# Patient Record
Sex: FEMALE | Race: WHITE | NOT HISPANIC OR LATINO | Employment: STUDENT | URBAN - METROPOLITAN AREA
[De-identification: names, ages, dates, MRNs, and addresses within clinical notes are randomized per-mention and may not be internally consistent; named-entity substitution may affect disease eponyms.]

---
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
## 2022-02-16 ENCOUNTER — TELEPHONE (OUTPATIENT)
Dept: UROLOGY | Facility: CLINIC | Age: 22
End: 2022-02-16
Payer: MEDICAID

## 2022-02-16 NOTE — TELEPHONE ENCOUNTER
----- Message from Tanya Correa sent at 2/16/2022 10:21 AM CST -----  Regarding: STAT appt request  Good morning,    Current pt is being referred to urology from JEAN-CLAUDE Preston for a stat appt, dx is kidney stones. I have scanned the referral and records in to media mgr. Please contact pt to schedule and let me know if I can help any further.    Thank you,  Tanya Correa  Clinic   Ext [phone]

## 2022-02-21 NOTE — PROGRESS NOTES
"Subjective:      Main Douglass is a 21 y.o. female who was referred by Dr. Josi Preston for evaluation of right renal stone.      Presented to ED on 2/10 with left flank pain. CT RSS revealed "3 mm stone within the left distal ureter, just proximal to the UVJ resulting in mild hydronephrosis and hydroureter."  UA revealed 3+ blood, no leuk, no nitrates. Discharged with flomax and Zofran. She is unsure if she passed stone but is no longer experiencing flank pain. Denies previous renal stone history. Denies f/c/n/v.     The following portions of the patient's history were reviewed and updated as appropriate: allergies, current medications, past family history, past medical history, past social history, past surgical history and problem list.    Review of Systems  Constitutional: no fever or chills  ENT: no nasal congestion or sore throat  Respiratory: no cough or shortness of breath  Cardiovascular: no chest pain or palpitations  Gastrointestinal: no nausea or vomiting, tolerating diet  Genitourinary: as per HPI  Hematologic/Lymphatic: no easy bruising or lymphadenopathy  Musculoskeletal: no arthralgias or myalgias  Neurological: no seizures or tremors  Behavioral/Psych: no auditory or visual hallucinations     Objective:   Vital Signs:/65 (BP Location: Left arm, Patient Position: Sitting, BP Method: Small (Automatic))   Pulse 96   Ht 5' 3" (1.6 m)   Wt 51.2 kg (112 lb 15.8 oz)   BMI 20.01 kg/m²     Physical Exam   General: alert and oriented, no acute distress  Head: normocephalic, atraumatic  Neck: supple, no lymphadenopathy, normal ROM, no masses  Respiratory: Symmetric expansion, non-labored breathing  Cardiovascular: regular rate and rhythm, nomal pulses, no peripheral edema  Abdomen: soft, non tender, non distended, no palpable masses, no hernias, no hepatomegaly or splenomegaly  Lymphatic: no inguinal nodes  Skin: normal coloration and turgor, no rashes, no suspicious skin lesions noted  Neuro: " alert and oriented x3, no gross deficits  Psych: normal judgment and insight, normal mood/affect and non-anxious    Lab Review   Urinalysis demonstrates positive for red blood cells (trace)  Lab Results   Component Value Date    WBC 10.73 02/10/2022    HGB 12.5 02/10/2022    HCT 38.0 02/10/2022    MCV 96 02/10/2022     02/10/2022     Lab Results   Component Value Date    CREATININE 0.8 02/10/2022    BUN 9 02/10/2022       Imaging   Results for orders placed during the hospital encounter of 02/10/22    CT Renal Stone Study ABD Pelvis WO    Narrative  EXAMINATION:  CT RENAL STONE STUDY ABD PELVIS WO    CLINICAL HISTORY:  Nephrolithiasis, uncomplicated;    TECHNIQUE:  Multiplanar images were obtained of the abdomen and pelvis from the hemidiaphragms through the symphysis pubis without intravenous contrast.    COMPARISON:  None    FINDINGS:  Lung Bases: Clear.    Heart: Heart size is normal.  No pericardial effusion.    Liver: Small focus of fat deposition adjacent to falciform ligament.  The liver is otherwise unremarkable without focal lesion.    Biliary tract: No intrahepatic or extrahepatic biliary ductal dilatation.    Gallbladder: No radiodense gallstone. No wall thickening or pericholecystic fluid.    Pancreas: Normal. No pancreatic ductal dilatation.    Spleen: Normal size without focal lesion.    Adrenals: Normal.    Kidneys and urinary collecting systems: There is a 3 mm stone within the left distal ureter, just proximal to the UVJ resulting in mild hydronephrosis and hydroureter.  There are several punctate right-sided nonobstructing nephroliths.  No right-sided hydronephrosis.    Lymph nodes: None enlarged.    Stomach and bowel: The stomach is normal.  Loops of small and large bowel are normal in caliber without evidence for inflammation or obstruction.  The appendix is visualized and is normal.    Peritoneum and mesentery: No ascites or free intraperitoneal air. No abdominal fluid  collection.    Vasculature: Normal.    Urinary bladder: Normal.    Reproductive organs: The uterus and adnexae are unremarkable.    Body wall: No abnormality.    Musculoskeletal: No aggressive osseous lesion.    Impression  Stone measuring 3 mm within the left distal ureter resulting in mild hydronephrosis.    Several additional punctate nonobstructing right-sided nephroliths.      Electronically signed by: Lonnie Robles  Date:    02/10/2022  Time:    08:19      Assessment and Plan:   1. Nephrolithiasis  --Discussed CT results: 1.5 mm non obstructing right renal stone; will monitor with KUB in 6 months   --Will obtain RBUS to evaluation hydronephrosis  --We discussed how to prevent future stones:   --Eat fewer high-oxalate foods (spinach, bran flakes, rhubarb, beets, potato chips, french fries, nuts and nut butters)   --Eat calcium rich foods   --Limit the vitamin C content of your diet. Do not take more than 500 mg of Vitamin C daily.   --It is very important to drink plenty of liquids. Your goal should be 10-12 glasses a day. At least 5-6 glasses should be water.    --Reduce sodium intake: 2-3 grams per day. Limit eating processed foods such as hot dogs, deli meats, sausage, canned products, dry soup mixes, sauerkraut, pickles, and various convenience mixes.   --Add citrus to diet i.e. lemonade, limes, orange juice.     --RTC in 1 month after RBUS          This note is dictated on M*Modal word recognition program.  There are word recognition mistakes that are occasionally missed on review.

## 2022-02-25 ENCOUNTER — OFFICE VISIT (OUTPATIENT)
Dept: UROLOGY | Facility: CLINIC | Age: 22
End: 2022-02-25
Payer: MEDICAID

## 2022-02-25 VITALS
SYSTOLIC BLOOD PRESSURE: 116 MMHG | DIASTOLIC BLOOD PRESSURE: 65 MMHG | HEIGHT: 63 IN | WEIGHT: 113 LBS | BODY MASS INDEX: 20.02 KG/M2 | HEART RATE: 96 BPM

## 2022-02-25 DIAGNOSIS — N20.0 NEPHROLITHIASIS: Primary | ICD-10-CM

## 2022-02-25 LAB
BILIRUB SERPL-MCNC: ABNORMAL MG/DL
BLOOD URINE, POC: ABNORMAL
CLARITY, POC UA: CLEAR
COLOR, POC UA: YELLOW
GLUCOSE UR QL STRIP: NORMAL
KETONES UR QL STRIP: ABNORMAL
LEUKOCYTE ESTERASE URINE, POC: ABNORMAL
NITRITE, POC UA: ABNORMAL
PH, POC UA: 7
PROTEIN, POC: ABNORMAL
SPECIFIC GRAVITY, POC UA: 1
UROBILINOGEN, POC UA: ABNORMAL

## 2022-02-25 PROCEDURE — 3008F BODY MASS INDEX DOCD: CPT | Mod: CPTII,,, | Performed by: NURSE PRACTITIONER

## 2022-02-25 PROCEDURE — 99999 PR PBB SHADOW E&M-EST. PATIENT-LVL IV: ICD-10-PCS | Mod: PBBFAC,,, | Performed by: NURSE PRACTITIONER

## 2022-02-25 PROCEDURE — 3074F PR MOST RECENT SYSTOLIC BLOOD PRESSURE < 130 MM HG: ICD-10-PCS | Mod: CPTII,,, | Performed by: NURSE PRACTITIONER

## 2022-02-25 PROCEDURE — 99203 PR OFFICE/OUTPT VISIT, NEW, LEVL III, 30-44 MIN: ICD-10-PCS | Mod: S$PBB,,, | Performed by: NURSE PRACTITIONER

## 2022-02-25 PROCEDURE — 99999 PR PBB SHADOW E&M-EST. PATIENT-LVL IV: CPT | Mod: PBBFAC,,, | Performed by: NURSE PRACTITIONER

## 2022-02-25 PROCEDURE — 99214 OFFICE O/P EST MOD 30 MIN: CPT | Mod: PBBFAC,PN | Performed by: NURSE PRACTITIONER

## 2022-02-25 PROCEDURE — 1159F PR MEDICATION LIST DOCUMENTED IN MEDICAL RECORD: ICD-10-PCS | Mod: CPTII,,, | Performed by: NURSE PRACTITIONER

## 2022-02-25 PROCEDURE — 3074F SYST BP LT 130 MM HG: CPT | Mod: CPTII,,, | Performed by: NURSE PRACTITIONER

## 2022-02-25 PROCEDURE — 99203 OFFICE O/P NEW LOW 30 MIN: CPT | Mod: S$PBB,,, | Performed by: NURSE PRACTITIONER

## 2022-02-25 PROCEDURE — 81002 URINALYSIS NONAUTO W/O SCOPE: CPT | Mod: PBBFAC,PN | Performed by: NURSE PRACTITIONER

## 2022-02-25 PROCEDURE — 1159F MED LIST DOCD IN RCRD: CPT | Mod: CPTII,,, | Performed by: NURSE PRACTITIONER

## 2022-02-25 PROCEDURE — 1160F PR REVIEW ALL MEDS BY PRESCRIBER/CLIN PHARMACIST DOCUMENTED: ICD-10-PCS | Mod: CPTII,,, | Performed by: NURSE PRACTITIONER

## 2022-02-25 PROCEDURE — 3008F PR BODY MASS INDEX (BMI) DOCUMENTED: ICD-10-PCS | Mod: CPTII,,, | Performed by: NURSE PRACTITIONER

## 2022-02-25 PROCEDURE — 3078F DIAST BP <80 MM HG: CPT | Mod: CPTII,,, | Performed by: NURSE PRACTITIONER

## 2022-02-25 PROCEDURE — 3078F PR MOST RECENT DIASTOLIC BLOOD PRESSURE < 80 MM HG: ICD-10-PCS | Mod: CPTII,,, | Performed by: NURSE PRACTITIONER

## 2022-02-25 PROCEDURE — 1160F RVW MEDS BY RX/DR IN RCRD: CPT | Mod: CPTII,,, | Performed by: NURSE PRACTITIONER

## 2022-02-25 RX ORDER — PROMETHAZINE HYDROCHLORIDE 12.5 MG/1
12.5 TABLET ORAL EVERY 8 HOURS PRN
COMMUNITY
Start: 2022-02-15

## 2022-02-25 RX ORDER — OXYCODONE AND ACETAMINOPHEN 10; 325 MG/1; MG/1
1 TABLET ORAL EVERY 6 HOURS PRN
COMMUNITY
Start: 2022-02-10 | End: 2022-09-15

## 2022-03-24 ENCOUNTER — TELEPHONE (OUTPATIENT)
Dept: UROLOGY | Facility: CLINIC | Age: 22
End: 2022-03-24
Payer: MEDICAID

## 2022-03-24 NOTE — TELEPHONE ENCOUNTER
----- Message from Erendira Zapata sent at 3/24/2022 11:26 AM CDT -----  Needs advice from nurse:      Who Called:Fco Collazo  Regarding:patient needs to reschedule audio visit due to not having US bc of the weather  Would the patient rather a call back or VIA V2contactner?  Best Call Back number:  Additional Info:

## 2022-03-24 NOTE — TELEPHONE ENCOUNTER
Spoke with pt. Stated that daughter had to reschedule ultrasound and will need to change follow up appointment. Stated that appointment has been changed.

## 2022-04-04 ENCOUNTER — TELEPHONE (OUTPATIENT)
Dept: UROLOGY | Facility: CLINIC | Age: 22
End: 2022-04-04
Payer: MEDICAID

## 2023-04-26 ENCOUNTER — OFFICE VISIT (OUTPATIENT)
Dept: UROLOGY | Facility: CLINIC | Age: 23
End: 2023-04-26
Payer: MEDICAID

## 2023-04-26 ENCOUNTER — TELEPHONE (OUTPATIENT)
Dept: UROLOGY | Facility: CLINIC | Age: 23
End: 2023-04-26
Payer: MEDICAID

## 2023-04-26 VITALS
HEIGHT: 62 IN | WEIGHT: 129.31 LBS | DIASTOLIC BLOOD PRESSURE: 64 MMHG | SYSTOLIC BLOOD PRESSURE: 105 MMHG | BODY MASS INDEX: 23.79 KG/M2 | HEART RATE: 90 BPM

## 2023-04-26 DIAGNOSIS — N13.30 HYDRONEPHROSIS OF RIGHT KIDNEY: ICD-10-CM

## 2023-04-26 DIAGNOSIS — N20.1 RIGHT URETERAL STONE: Primary | ICD-10-CM

## 2023-04-26 LAB
BILIRUB SERPL-MCNC: NEGATIVE MG/DL
BLOOD URINE, POC: ABNORMAL
CLARITY, POC UA: CLEAR
COLOR, POC UA: YELLOW
GLUCOSE UR QL STRIP: NEGATIVE
KETONES UR QL STRIP: NEGATIVE
LEUKOCYTE ESTERASE URINE, POC: NEGATIVE
NITRITE, POC UA: NEGATIVE
PH, POC UA: 6
PROTEIN, POC: NEGATIVE
SPECIFIC GRAVITY, POC UA: 1.01
UROBILINOGEN, POC UA: NORMAL

## 2023-04-26 PROCEDURE — 1160F PR REVIEW ALL MEDS BY PRESCRIBER/CLIN PHARMACIST DOCUMENTED: ICD-10-PCS | Mod: CPTII,,, | Performed by: NURSE PRACTITIONER

## 2023-04-26 PROCEDURE — 3078F PR MOST RECENT DIASTOLIC BLOOD PRESSURE < 80 MM HG: ICD-10-PCS | Mod: CPTII,,, | Performed by: NURSE PRACTITIONER

## 2023-04-26 PROCEDURE — 87086 URINE CULTURE/COLONY COUNT: CPT | Performed by: NURSE PRACTITIONER

## 2023-04-26 PROCEDURE — 99999 PR PBB SHADOW E&M-EST. PATIENT-LVL IV: CPT | Mod: PBBFAC,,, | Performed by: NURSE PRACTITIONER

## 2023-04-26 PROCEDURE — 1159F PR MEDICATION LIST DOCUMENTED IN MEDICAL RECORD: ICD-10-PCS | Mod: CPTII,,, | Performed by: NURSE PRACTITIONER

## 2023-04-26 PROCEDURE — 99999 PR PBB SHADOW E&M-EST. PATIENT-LVL IV: ICD-10-PCS | Mod: PBBFAC,,, | Performed by: NURSE PRACTITIONER

## 2023-04-26 PROCEDURE — 1159F MED LIST DOCD IN RCRD: CPT | Mod: CPTII,,, | Performed by: NURSE PRACTITIONER

## 2023-04-26 PROCEDURE — 3074F SYST BP LT 130 MM HG: CPT | Mod: CPTII,,, | Performed by: NURSE PRACTITIONER

## 2023-04-26 PROCEDURE — 3078F DIAST BP <80 MM HG: CPT | Mod: CPTII,,, | Performed by: NURSE PRACTITIONER

## 2023-04-26 PROCEDURE — 99214 PR OFFICE/OUTPT VISIT, EST, LEVL IV, 30-39 MIN: ICD-10-PCS | Mod: S$PBB,,, | Performed by: NURSE PRACTITIONER

## 2023-04-26 PROCEDURE — 1160F RVW MEDS BY RX/DR IN RCRD: CPT | Mod: CPTII,,, | Performed by: NURSE PRACTITIONER

## 2023-04-26 PROCEDURE — 3008F PR BODY MASS INDEX (BMI) DOCUMENTED: ICD-10-PCS | Mod: CPTII,,, | Performed by: NURSE PRACTITIONER

## 2023-04-26 PROCEDURE — 3074F PR MOST RECENT SYSTOLIC BLOOD PRESSURE < 130 MM HG: ICD-10-PCS | Mod: CPTII,,, | Performed by: NURSE PRACTITIONER

## 2023-04-26 PROCEDURE — 99214 OFFICE O/P EST MOD 30 MIN: CPT | Mod: PBBFAC,PN | Performed by: NURSE PRACTITIONER

## 2023-04-26 PROCEDURE — 81002 URINALYSIS NONAUTO W/O SCOPE: CPT | Mod: PBBFAC,PN | Performed by: NURSE PRACTITIONER

## 2023-04-26 PROCEDURE — 99214 OFFICE O/P EST MOD 30 MIN: CPT | Mod: S$PBB,,, | Performed by: NURSE PRACTITIONER

## 2023-04-26 PROCEDURE — 3008F BODY MASS INDEX DOCD: CPT | Mod: CPTII,,, | Performed by: NURSE PRACTITIONER

## 2023-04-26 RX ORDER — TAMSULOSIN HYDROCHLORIDE 0.4 MG/1
0.4 CAPSULE ORAL DAILY
Qty: 30 CAPSULE | Refills: 11 | Status: SHIPPED | OUTPATIENT
Start: 2023-04-26 | End: 2023-04-26

## 2023-04-26 RX ORDER — KETOROLAC TROMETHAMINE 10 MG/1
10 TABLET, FILM COATED ORAL EVERY 6 HOURS PRN
Qty: 30 TABLET | Refills: 0 | Status: SHIPPED | OUTPATIENT
Start: 2023-04-26

## 2023-04-26 NOTE — TELEPHONE ENCOUNTER
Called pt, no answer, vm left  Called pt mom, pt mom stated that pt has an appointment with debbie today @ 2:30pm.

## 2023-04-26 NOTE — PROGRESS NOTES
"Subjective:      Main Douglass is a 22 y.o. female who returns today regarding right UVJ stone.    Urolithiasis  Patient complains of right flank pain with and without radiation to the groin. Onset of symptoms was abrupt starting 2 days ago with controlled course since that time. Patient describes the pain as colicky, cramping, and stabbing, and rated as moderate and off and on. The patient has had nausea and no vomiting and diaphoresis. There has been no fever or chills. The patient is not complaining of dysuria or frequency. Risk factors for urolithiasis: none.  She presented to the emergency department yesterday (4/25) with these complaints.  CT renal stone study revealed 5 mm right UVJ stone with associated hydronephrosis. Discharged with MET; has not passed stone.       The following portions of the patient's history were reviewed and updated as appropriate: allergies, current medications, past family history, past medical history, past social history, past surgical history and problem list.    Review of Systems  A comprehensive multipoint review of systems was negative except as otherwise stated in the HPI.     Objective:   Vitals: /64 (BP Location: Right arm, Patient Position: Sitting, BP Method: Small (Automatic))   Pulse 90   Ht 5' 2" (1.575 m)   Wt 58.7 kg (129 lb 4.8 oz)   BMI 23.65 kg/m²       Physical Exam   General: alert and oriented, no acute distress  Head: normocephalic, atraumatic  Neck: supple, no lymphadenopathy, normal ROM, no masses  Respiratory: Symmetric expansion, non-labored breathing  Cardiovascular: regular rate and rhythm, nomal pulses, no peripheral edema  Abdomen: soft, non tender, non distended, no palpable masses, no hernias, no hepatomegaly or splenomegaly  Skin: normal coloration and turgor, no rashes, no suspicious skin lesions noted  Neuro: alert and oriented x3, no gross deficits  Psych: normal judgment and insight, normal mood/affect, and non-anxious    Lab Review "   Urinalysis demonstrates positive for red blood cells (trace)  Lab Results   Component Value Date    WBC 10.72 04/25/2023    HGB 11.6 (L) 04/25/2023    HCT 36.9 (L) 04/25/2023    MCV 94 04/25/2023     04/25/2023     Lab Results   Component Value Date    CREATININE 0.9 04/25/2023    BUN 9 04/25/2023       Imaging   Results for orders placed during the hospital encounter of 04/25/23    CT Renal Stone Study ABD Pelvis WO    Narrative  EXAMINATION:  CT RENAL STONE STUDY ABD PELVIS WO    CLINICAL HISTORY:  Flank pain, kidney stone suspected;    TECHNIQUE:  Low dose axial images, sagittal and coronal reformations were obtained from the lung bases to the pubic symphysis without oral or IV contrast    COMPARISON:  CT abdomen pelvis 09/15/2022    FINDINGS:  Abdomen:    - Lung bases: Lung bases are clear.  There is no cardiomegaly.    Lack of intravenous contrast limits evaluation of the solid organs for focal lesions.    - Liver: Liver is homogeneous as imaged.    - Gallbladder: There is no CT evidence of cholecystitis and no calcified gallstone.    - Bile Ducts: No evidence of intra or extra hepatic biliary ductal dilation.    - Spleen: Negative.    - Kidneys: There is a 5 mm suspected calculus at the level of the right UVJ and moderate hydronephrosis and ureterectasis.  No other calculi are seen in the right kidney.  Left kidney cortex appears homogeneous as imaged and there is no evidence of hydronephrosis or calculus.    - Adrenals: Unremarkable.    - Pancreas: There is no mass or peripancreatic fat stranding.    - Retroperitoneum:  No significant adenopathy.    - Vascular: No abdominal aortic aneurysm or significant adenopathy.    - Abdominal wall:  Unremarkable.    Pelvis:    Bladder is collapsed and otherwise unremarkable as imaged.  No pelvic mass, adenopathy, or free fluid.    Bowel/Mesentery:    Appendix appears normal.  There is no evidence of bowel obstruction or mesenteric adenopathy or inflammation.   There is no extraluminal free air or free fluid in the abdomen or pelvis.    Bones: Spine alignment is intact.  There is mild loss of disc height at L5-S1.  There is no acute osseous abnormality and no suspicious lytic or blastic lesion.    Impression  5 mm obstructing calculus at the level of the right UVJ with moderate hydro ureterectasis on the right.  No other calculi.    No other acute abnormalities in the abdomen or pelvis or significant findings as imaged..    This report was flagged in Epic as abnormal.      Electronically signed by: Soledad Corrigan  Date:    04/25/2023  Time:    21:40      Assessment and Plan:   1. Hydronephrosis of right kidney  2. Right ureteral stone  --reviewed CT with pt and discussed high probability  --Recommend general preventive measures, to include increased hydration, low Na diet, and increased citrus intake  --Discussed indications to present to ED, including fever, intractable pain, and intractable nausea/vomitting      --Drink 2-3 liters of water daily  --Continue flomax  --Continue Toradol for pain; rx sent   --Continue narcotic as needed for breakthrough pain  --Strain every urine.  Strainer provided.    --Patient instructed to bring in stone for stone analysis.  Sterile cup provided.      --will notify with results  --FU in 1 month with RBUS   --work note provided     This note is dictated on M*Modal word recognition program.  There are word recognition mistakes that are occasionally missed on review.

## 2023-04-27 LAB — BACTERIA UR CULT: NO GROWTH

## 2023-06-01 ENCOUNTER — TELEPHONE (OUTPATIENT)
Dept: UROLOGY | Facility: CLINIC | Age: 23
End: 2023-06-01
Payer: MEDICAID

## 2023-06-01 NOTE — TELEPHONE ENCOUNTER
No answer. Message left to call clinic back. Appt today is canceled due to US not being done. US and f/u appt needs to be rescheduled.

## 2023-11-04 ENCOUNTER — HOSPITAL ENCOUNTER (EMERGENCY)
Facility: HOSPITAL | Age: 23
Discharge: HOME | End: 2023-11-04
Attending: EMERGENCY MEDICINE
Payer: MEDICAID

## 2023-11-04 VITALS
DIASTOLIC BLOOD PRESSURE: 82 MMHG | WEIGHT: 120 LBS | SYSTOLIC BLOOD PRESSURE: 128 MMHG | HEART RATE: 88 BPM | TEMPERATURE: 98 F | HEIGHT: 63 IN | BODY MASS INDEX: 21.26 KG/M2 | RESPIRATION RATE: 16 BRPM | OXYGEN SATURATION: 98 %

## 2023-11-04 DIAGNOSIS — S61.210A LACERATION OF RIGHT INDEX FINGER WITHOUT FOREIGN BODY WITHOUT DAMAGE TO NAIL, INITIAL ENCOUNTER: Primary | ICD-10-CM

## 2023-11-04 DIAGNOSIS — S62.639B OPEN FRACTURE OF TUFT OF DISTAL PHALANX OF FINGER: ICD-10-CM

## 2023-11-04 LAB
B-HCG UR QL: NEGATIVE
CTP QC/QA: YES

## 2023-11-04 PROCEDURE — 99284 EMERGENCY DEPT VISIT MOD MDM: CPT

## 2023-11-04 PROCEDURE — 96372 THER/PROPH/DIAG INJ SC/IM: CPT | Performed by: PHYSICIAN ASSISTANT

## 2023-11-04 PROCEDURE — 81025 URINE PREGNANCY TEST: CPT | Performed by: PHYSICIAN ASSISTANT

## 2023-11-04 PROCEDURE — 63600175 PHARM REV CODE 636 W HCPCS: Performed by: PHYSICIAN ASSISTANT

## 2023-11-04 PROCEDURE — 90715 TDAP VACCINE 7 YRS/> IM: CPT | Performed by: PHYSICIAN ASSISTANT

## 2023-11-04 PROCEDURE — 90471 IMMUNIZATION ADMIN: CPT | Performed by: PHYSICIAN ASSISTANT

## 2023-11-04 PROCEDURE — 25000003 PHARM REV CODE 250: Performed by: PHYSICIAN ASSISTANT

## 2023-11-04 RX ORDER — LIDOCAINE HYDROCHLORIDE 10 MG/ML
10 INJECTION INFILTRATION; PERINEURAL
Status: COMPLETED | OUTPATIENT
Start: 2023-11-04 | End: 2023-11-04

## 2023-11-04 RX ORDER — KETOROLAC TROMETHAMINE 30 MG/ML
15 INJECTION, SOLUTION INTRAMUSCULAR; INTRAVENOUS
Status: COMPLETED | OUTPATIENT
Start: 2023-11-04 | End: 2023-11-04

## 2023-11-04 RX ORDER — SULFAMETHOXAZOLE AND TRIMETHOPRIM 800; 160 MG/1; MG/1
1 TABLET ORAL 2 TIMES DAILY
Qty: 20 TABLET | Refills: 0 | Status: SHIPPED | OUTPATIENT
Start: 2023-11-04 | End: 2023-11-14

## 2023-11-04 RX ADMIN — LIDOCAINE HYDROCHLORIDE 10 ML: 10 INJECTION, SOLUTION INFILTRATION; PERINEURAL at 01:11

## 2023-11-04 RX ADMIN — TETANUS TOXOID, REDUCED DIPHTHERIA TOXOID AND ACELLULAR PERTUSSIS VACCINE, ADSORBED 0.5 ML: 5; 2.5; 8; 8; 2.5 SUSPENSION INTRAMUSCULAR at 01:11

## 2023-11-04 RX ADMIN — KETOROLAC TROMETHAMINE 15 MG: 30 INJECTION, SOLUTION INTRAMUSCULAR; INTRAVENOUS at 01:11

## 2023-11-04 NOTE — ED NOTES
LOC: The patient is awake, alert and aware of environment with an appropriate affect, the patient is oriented x 4 and speaking appropriately.  APPEARANCE: Patient resting comfortably and in no acute distress, patient is clean and well groomed.  SKIN: The skin is warm and dry, color consistent with ethnicity, patient has normal skin turgor and moist mucus membranes, skin intact, no breakdown or brusing noted.  MUSKULOSKELETAL: Patient moving all extremities well, obvious swelling and deformities noted to 2nd digit on right hand.  Patient endorses slamming it in car door.  RESPIRATORY: Airway is open and patent, respirations are spontaneous, patient has a normal effort and rate, no accessory muscle use noted.  CARDIAC: Patient has a normal rate and rhythm, no periphreal edema noted, capillary refill < 3 seconds.  ABDOMEN: Soft and non tender to palpation, no distention noted.   NEUROLOGIC: PERRL, eyes open spontaneously, behavior appropriate to situation, follows commands, facial expression symmetrical, bilateral hand grasp equal and even, purposeful motor response noted, normal sensation in all extremities when touched with a finger.

## 2023-11-04 NOTE — ED PROVIDER NOTES
Encounter Date: 11/4/2023       History     Chief Complaint   Patient presents with    Finger Injury     Right index finger slammed in door      23-year-old female presents ER for evaluation of finger injury.  Patient states that about 20 minutes prior to arrival to ER she accidentally slammed her right index finger against the car door.  She reports some bleeding at the site.  Denies any paresthesia focal weakness.  She is unsure of tetanus status.  Did not take any medicine prior to arrival to ER.  No recent surgery to the site.  Does not use blood thinners    The history is provided by the patient.     Review of patient's allergies indicates:  No Known Allergies  No past medical history on file.  No past surgical history on file.  No family history on file.  Social History     Tobacco Use    Smoking status: Never   Substance Use Topics    Alcohol use: Not Currently    Drug use: Yes     Types: Marijuana     Review of Systems   Constitutional:  Negative for fever.   Musculoskeletal:  Positive for arthralgias. Negative for myalgias.   Skin:  Positive for wound.   Allergic/Immunologic: Negative for immunocompromised state.   Neurological:  Negative for weakness and numbness.   Hematological:  Does not bruise/bleed easily.   Psychiatric/Behavioral:  Negative for confusion.        Physical Exam     Initial Vitals [11/04/23 1232]   BP Pulse Resp Temp SpO2   (!) 132/92 97 19 98.7 °F (37.1 °C) 99 %      MAP       --         Physical Exam    Vitals reviewed.  Constitutional: She appears well-developed and well-nourished. She is not diaphoretic. No distress.   HENT:   Head: Normocephalic and atraumatic.   Eyes: Conjunctivae and EOM are normal.   Neck: Neck supple.   Cardiovascular:  Intact distal pulses.           Pulmonary/Chest: No respiratory distress.   Musculoskeletal:      Right hand: Swelling and bony tenderness (distal index finger- small superficial laceration at the matrix of nail) present. Decreased range of  motion (due to pain).      Cervical back: Neck supple.     Neurological: She is alert and oriented to person, place, and time.   Skin: Skin is warm.         ED Course   Procedures  Labs Reviewed   POCT URINE PREGNANCY          Imaging Results               X-Ray Finger 2 or More Views Right (Final result)  Result time 11/04/23 13:56:06      Final result by Melva Johnson MD (11/04/23 13:56:06)                   Impression:      As above.  If clinically indicated, follow-up radiographs could be performed to confirm the linear cortical lucency along the posterior aspect of the distal phalanx of the index finger does not represent a nondisplaced fracture.    This report was flagged in Epic as abnormal.      Electronically signed by: Melva Johnson MD  Date:    11/04/2023  Time:    13:56               Narrative:    EXAMINATION:  XR FINGER 2 OR MORE VIEWS RIGHT    CLINICAL HISTORY:  finger injury w/ laceration;    TECHNIQUE:  AP, oblique, and lateral radiographs of the right index finger were performed.    COMPARISON:  November 5, 2013    FINDINGS:  There is a skin defect with air in the soft tissues along the dorsal aspect of the distal phalanx of the index finger.  Cortical lucency along the posterior surface of the distal phalanx may represent a vascular channel, but a nondisplaced fracture cannot be completely excluded.  No other findings to suggest acute fracture or bony destructive process.  Alignment is preserved.                                       Medications   ketorolac injection 15 mg (15 mg Intramuscular Given 11/4/23 1346)   Tdap (BOOSTRIX) vaccine injection 0.5 mL (0.5 mLs Intramuscular Given 11/4/23 1352)   LIDOcaine HCL 10 mg/ml (1%) injection 10 mL (10 mLs Infiltration Given by Other 11/4/23 1348)     Medical Decision Making  Differentials:  Finger fracture, laceration, contusion    Amount and/or Complexity of Data Reviewed  Labs: ordered.  Radiology: ordered. Decision-making details documented  in ED Course.    Risk  Prescription drug management.         APC / Resident Notes:   In the ER promptly upon arrival.  She is afebrile, no acute distress.        ED Course as of 11/04/23 1837   Sat Nov 04, 2023   1449 X-Ray Finger 2 or More Views Right(!) [AJ]   1449 FINDINGS:  There is a skin defect with air in the soft tissues along the dorsal aspect of the distal phalanx of the index finger.  Cortical lucency along the posterior surface of the distal phalanx may represent a vascular channel, but a nondisplaced fracture cannot be completely excluded.  No other findings to suggest acute fracture or bony destructive process.  Alignment is preserved.   [AJ]   1449 1% lidocaine used as anesthetic agent.  Digital block was done with out difficulty.    The nail bed and laceration was then carefully assessed.  Nail bed appears to be intact.  There is a small cuticle skin laceration that appears to be superficial in nature.  No further active bleeding.  Range motion of the finger intact after anesthesia was achieved.       The wound was aggressively irrigated with 1 L normal saline     Patient placed into a static finger splint with curlix by nursing staff.  This was assessed by me after splint placement.   Will advise patient to keep the wound dry and clean at all times.  Will cover with Bactrim given open fracture criteria.  Will give follow-up information with orthopedist.  Advised watch for signs of infection including redness, swelling, drainage, fever chills.  Advised to take Tylenol or Motrin for pain as needed.  She was given strict precautions ED which was agreeable to.  She is otherwise stable for discharge and close follow-up.    The care of this patient was overseen by attending physician who agrees with treatment, plan, and disposition.    Disclaimer: This note has been generated using voice-recognition software. There may be typographical errors that have been missed during proof-reading.     [AJ]      ED  Course User Index  [AJ] Anita Peña PA-C                    Clinical Impression:   Final diagnoses:  [S61.210A] Laceration of right index finger without foreign body without damage to nail, initial encounter (Primary)  [V51.551O] Open fracture of tuft of distal phalanx of finger        ED Disposition Condition    Discharge Stable          ED Prescriptions       Medication Sig Dispense Start Date End Date Auth. Provider    sulfamethoxazole-trimethoprim 800-160mg (BACTRIM DS) 800-160 mg Tab Take 1 tablet by mouth 2 (two) times daily. for 10 days 20 tablet 11/4/2023 11/14/2023 Anita Peña PA-C          Follow-up Information       Follow up With Specialties Details Why Contact Info Additional Information    [facility] Hand [facility]   [address] [place] [id number] [phone]     [facility]   [address] [facility], 5th [address]  [facility] [address] [phone] [facility] - Main Building, 5th Floor Please park in [facility] and take Atrium elevator             Anita Peña PA-C  11/04/23 1841       Anita Peña PA-C  11/04/23 1850

## 2023-11-04 NOTE — ED TRIAGE NOTES
Main Douglass, a 23 y.o. female presents to the ED w/ complaint of finger injury.    Triage note:  Chief Complaint   Patient presents with    Finger Injury     Right index finger slammed in door      Review of patient's allergies indicates:  No Known Allergies  No past medical history on file.

## 2023-11-04 NOTE — DISCHARGE INSTRUCTIONS
Please keep the splint on at all times.  Follow-up with orthopedist/hand specialist as provided.  You may continue to ice, elevate and rest.  Tylenol or Motrin for pain as needed.    Watch for signs of infection including redness, swelling, drainage, fever chills.  You may use antibiotic cream to the site with a Band-Aid daily.    Return to the ER with concerning or worsening symptoms.  
oral

## 2024-03-30 PROBLEM — J10.1 INFLUENZA A: Status: ACTIVE | Noted: 2024-03-30
